# Patient Record
Sex: MALE | Race: WHITE
[De-identification: names, ages, dates, MRNs, and addresses within clinical notes are randomized per-mention and may not be internally consistent; named-entity substitution may affect disease eponyms.]

---
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
## 2023-12-29 ENCOUNTER — APPOINTMENT (OUTPATIENT)
Dept: ORTHOPEDIC SURGERY | Facility: CLINIC | Age: 23
End: 2023-12-29
Payer: COMMERCIAL

## 2023-12-29 VITALS — WEIGHT: 198 LBS | HEIGHT: 71 IN | BODY MASS INDEX: 27.72 KG/M2

## 2023-12-29 DIAGNOSIS — Z78.9 OTHER SPECIFIED HEALTH STATUS: ICD-10-CM

## 2023-12-29 DIAGNOSIS — R22.30 LOCALIZED SWELLING, MASS AND LUMP, UNSPECIFIED UPPER LIMB: ICD-10-CM

## 2023-12-29 PROCEDURE — 99214 OFFICE O/P EST MOD 30 MIN: CPT

## 2024-01-11 PROBLEM — R22.30 FOREARM MASS: Status: ACTIVE | Noted: 2024-01-11

## 2024-01-11 NOTE — ASSESSMENT
[FreeTextEntry1] : Right forearm mass - reviewed overall pathoanatomy with patient. Discussed we can pursue MRI to further define characteristics of mass or observe and proceed with further workup should mass enlarge.  Will observe at this time.  F/u 6 mo/prn

## 2024-01-11 NOTE — IMAGING
[de-identified] : Right forearm with prominent at ulnar distal forearm. No erythema, -ttp. Full wrist and finger arc of motion. Able to make fist, oppose thumb to small finger and abduct fingers. Sensation intact throughout. <2sec cap refill.